# Patient Record
Sex: FEMALE | Race: WHITE | URBAN - METROPOLITAN AREA
[De-identification: names, ages, dates, MRNs, and addresses within clinical notes are randomized per-mention and may not be internally consistent; named-entity substitution may affect disease eponyms.]

---

## 2018-12-18 ENCOUNTER — OFFICE VISIT (OUTPATIENT)
Dept: URBAN - METROPOLITAN AREA CLINIC 23 | Facility: CLINIC | Age: 63
End: 2018-12-18
Payer: COMMERCIAL

## 2018-12-18 PROCEDURE — 92134 CPTRZ OPH DX IMG PST SGM RTA: CPT | Performed by: OPHTHALMOLOGY

## 2018-12-18 PROCEDURE — 99213 OFFICE O/P EST LOW 20 MIN: CPT | Performed by: OPHTHALMOLOGY

## 2018-12-18 ASSESSMENT — INTRAOCULAR PRESSURE
OS: 13
OD: 16

## 2018-12-18 NOTE — IMPRESSION/PLAN
Impression: Cystoid macular degeneration, right eye - Condition: unstable. Code: P13.686.
s/p TSPCIOL OD 12/23/2015 with Dr. Arvin Gaviria. s/p RD repair OD 4/17/2015 w/Dr Arvin Mustafa s/p S.O. Removal 6/4/15 w/ retinal tear repair. Plan: Discussed diagnosis in detail with patient. Exam and OCT show mild edema OD. Recommend to start Prolensa or Ilevro OD QD to help reduce the swelling. Will reassess condition in 10 wks. (gave pt 2 different coupons to see if it helps with cost).

## 2019-03-18 ENCOUNTER — OFFICE VISIT (OUTPATIENT)
Dept: URBAN - METROPOLITAN AREA CLINIC 23 | Facility: CLINIC | Age: 64
End: 2019-03-18
Payer: COMMERCIAL

## 2019-03-18 DIAGNOSIS — H04.123 DRY EYE SYNDROME OF BILATERAL LACRIMAL GLANDS: ICD-10-CM

## 2019-03-18 DIAGNOSIS — H35.351 CYSTOID MACULAR DEGENERATION, RIGHT EYE: Primary | ICD-10-CM

## 2019-03-18 PROCEDURE — 99213 OFFICE O/P EST LOW 20 MIN: CPT | Performed by: OPHTHALMOLOGY

## 2019-03-18 PROCEDURE — 92134 CPTRZ OPH DX IMG PST SGM RTA: CPT | Performed by: OPHTHALMOLOGY

## 2019-03-18 ASSESSMENT — INTRAOCULAR PRESSURE
OD: 12
OS: 14

## 2019-03-18 NOTE — IMPRESSION/PLAN
Impression: Dry eye syndrome of bilateral lacrimal glands: H04.123. OU. Condition: unstable. Vision: vision affected. Plan: Dry eyes account for the patient's complaints. There is no evidence of permanent changes to the cornea. Explained condition does not have a cure and will need GEL artificial tears or Ointment for maintenance.

## 2019-03-18 NOTE — IMPRESSION/PLAN
Impression: Cystoid macular degeneration, right eye - Condition: improved, stable. Code: B69.912.
s/p TSPCIOL OD 12/23/2015 with Dr. Michelle Garvin. s/p RD repair OD 4/17/2015 w/Dr Michelle Man s/p S.O. Removal 6/4/15 w/ retinal tear repair. Plan: Discussed diagnosis in detail with patient. Exam and OCT show no active edema OD. Recommend stopping Prolensa, macula is stable today no edema. Recommend Following up in 7 months when returns from [place] Exam and OCT of the left eye show stable macula, no treatment needed.

## 2019-03-20 ENCOUNTER — OFFICE VISIT (OUTPATIENT)
Dept: URBAN - METROPOLITAN AREA CLINIC 23 | Facility: CLINIC | Age: 64
End: 2019-03-20
Payer: COMMERCIAL

## 2019-03-20 DIAGNOSIS — H40.032 ANATOMICAL NARROW ANGLE, LEFT EYE: Primary | ICD-10-CM

## 2019-03-20 DIAGNOSIS — H40.2214: ICD-10-CM

## 2019-03-20 PROCEDURE — 92133 CPTRZD OPH DX IMG PST SGM ON: CPT | Performed by: OPHTHALMOLOGY

## 2019-03-20 PROCEDURE — 99214 OFFICE O/P EST MOD 30 MIN: CPT | Performed by: OPHTHALMOLOGY

## 2019-03-20 RX ORDER — BROMFENAC SODIUM 0.7 MG/ML
0.07 % SOLUTION/ DROPS OPHTHALMIC
Qty: 3 | Refills: 5 | Status: INACTIVE
Start: 2019-03-20 | End: 2019-03-20

## 2019-03-20 ASSESSMENT — INTRAOCULAR PRESSURE
OS: 16
OD: 16

## 2019-03-20 NOTE — IMPRESSION/PLAN
Impression: Chronic angle-closure glaucoma of right eye, indeterminate stage: X96.8870.  Plan: see notes #1

## 2019-03-20 NOTE — IMPRESSION/PLAN
Impression: Anatomical narrow angle, left eye: H40.032. IOP OU doing well without glaucoma meds. ONH stable ou -
ONH large C/D ratio OD - LPI OS 2/16/17
healthy ONH OS- Plan: Discussed diagnosis, explained and understood. OCT ordered and reviewed with patient. Discussed IOP/ONH/Glaucoma management and risks, explained and understood. no glaucoma meds needed at this time ou.
will continue to monitor pressure. patient going home for the summer. 
patient will see MD in [address] [place] [address] in 4 mo for IOP check

## 2019-12-05 ENCOUNTER — OFFICE VISIT (OUTPATIENT)
Dept: URBAN - METROPOLITAN AREA CLINIC 23 | Facility: CLINIC | Age: 64
End: 2019-12-05
Payer: COMMERCIAL

## 2019-12-05 PROCEDURE — 99213 OFFICE O/P EST LOW 20 MIN: CPT | Performed by: OPHTHALMOLOGY

## 2019-12-05 PROCEDURE — 92134 CPTRZ OPH DX IMG PST SGM RTA: CPT | Performed by: OPHTHALMOLOGY

## 2019-12-05 ASSESSMENT — INTRAOCULAR PRESSURE
OS: 14
OD: 14

## 2019-12-05 NOTE — IMPRESSION/PLAN
Impression: Cystoid macular degeneration, right eye - Condition: improved, stable. Code: O47.656.
s/p TSPCIOL OD 12/23/2015 with Dr. Cisco Brooks. s/p RD repair OD 4/17/2015 w/Dr Cisco Caruso s/p S.O. Removal 6/4/15 w/ retinal tear repair. Plan: Discussed diagnosis in detail with patient. Exam and OCT show no active edema OD. Recommend staying off Prolensa, macula is stable today no edema. Recommend follow up in 5 months Exam and OCT of the left eye show stable macula, no treatment needed.

## 2020-04-23 ENCOUNTER — OFFICE VISIT (OUTPATIENT)
Dept: URBAN - METROPOLITAN AREA CLINIC 23 | Facility: CLINIC | Age: 65
End: 2020-04-23
Payer: COMMERCIAL

## 2020-04-23 PROCEDURE — 99213 OFFICE O/P EST LOW 20 MIN: CPT | Performed by: OPHTHALMOLOGY

## 2020-04-23 ASSESSMENT — INTRAOCULAR PRESSURE
OS: 11
OD: 11

## 2020-04-23 NOTE — IMPRESSION/PLAN
Impression: Cystoid macular degeneration, right eye - Condition: stable. Code: R22.154.
s/p TSPCIOL OD 12/23/2015 with Dr. Alejandra Mueller. s/p RD repair OD 4/17/2015 w/Dr Alejandra Hickey s/p S.O. Removal 6/4/15 w/ retinal tear repair. Plan: Discussed diagnosis in detail with patient. Exam shows the retina is stable. OCT and Optos shows the macula is stable. Recommend a retina follow - up in [place] if needed. No qtts needed. OCT and Optos of the left eye show stable macula, no treatment needed. Recommend a Lid Consult w/Dr Seng Appiah. : skin tag LLL.

## 2020-04-28 ENCOUNTER — OFFICE VISIT (OUTPATIENT)
Dept: URBAN - METROPOLITAN AREA CLINIC 23 | Facility: CLINIC | Age: 65
End: 2020-04-28
Payer: COMMERCIAL

## 2020-04-28 DIAGNOSIS — D48.1 NEOPLASM OF UNCERTAIN BEHAVIOR OF CONNECTIVE AND OTHER SOFT TISSUE: Primary | ICD-10-CM

## 2020-04-28 PROCEDURE — 99214 OFFICE O/P EST MOD 30 MIN: CPT | Performed by: OPHTHALMOLOGY

## 2020-04-28 PROCEDURE — 67840 REMOVE EYELID LESION: CPT | Performed by: OPHTHALMOLOGY

## 2020-04-28 RX ORDER — ERYTHROMYCIN 5 MG/G
OINTMENT OPHTHALMIC
Qty: 1 | Refills: 0 | Status: INACTIVE
Start: 2020-04-28 | End: 2020-12-31

## 2020-04-28 NOTE — IMPRESSION/PLAN
Impression: Neoplasm of uncertain behavior of connective and other soft tissue: D48.1. Plan: Discussed diagnosis in detail with patient. Discussed treatment options with patient. Recommend biopsy, obtain prior authorization, further conduct after biopsy results. RBA's discussed in detail with patient today, patient understands and agrees and wishes to proceed with procedure. Schedule excision of lesion with biopsy of the Left Lower Eyelid, same day in clinic.

## 2020-12-31 ENCOUNTER — OFFICE VISIT (OUTPATIENT)
Dept: URBAN - METROPOLITAN AREA CLINIC 23 | Facility: CLINIC | Age: 65
End: 2020-12-31
Payer: COMMERCIAL

## 2020-12-31 PROCEDURE — 92134 CPTRZ OPH DX IMG PST SGM RTA: CPT | Performed by: OPHTHALMOLOGY

## 2020-12-31 PROCEDURE — 99213 OFFICE O/P EST LOW 20 MIN: CPT | Performed by: OPHTHALMOLOGY

## 2020-12-31 ASSESSMENT — INTRAOCULAR PRESSURE
OD: 11
OS: 11

## 2020-12-31 NOTE — IMPRESSION/PLAN
Impression: Cystoid macular degeneration, right eye - Condition: stable. Code: K84.136. Vision: affected. s/p TSPCIOL OD 12/23/2015 with Dr. Juan Antonio Rodas. s/p RD repair OD 4/17/2015 w/Dr Juan Antonio Nelson s/p S.O. Removal 6/4/15 w/ retinal tear repair. Plan: Discussed diagnosis in detail with patient. Exam shows the retina is stable. OCT OD shows a decrease in CMT and Optos shows the macula is stable. Recommend a retina follow - up in 1yr, sooner if there is a change or decrease in vision. Continue follow - ups in [place] if needed. OCT and Optos of the left eye show stable macula, no treatment needed. Patient states she had a fall in July and hit her right side of her face and injured her right eye. Saw a doctor in [place], [place] and was treated with eye meds due to elevated IOP, she has been stable for the last 3 mos.

## 2021-12-30 ENCOUNTER — OFFICE VISIT (OUTPATIENT)
Dept: URBAN - METROPOLITAN AREA CLINIC 23 | Facility: CLINIC | Age: 66
End: 2021-12-30
Payer: MEDICARE

## 2021-12-30 DIAGNOSIS — H33.051 TOTAL RETINAL DETACHMENT, RIGHT EYE: Primary | ICD-10-CM

## 2021-12-30 DIAGNOSIS — H43.822 VITREOMACULAR ADHESION, LEFT EYE: ICD-10-CM

## 2021-12-30 PROCEDURE — 92313 C-LENS FITG CORNEOSCLRL LENS: CPT | Performed by: OPHTHALMOLOGY

## 2021-12-30 PROCEDURE — 99213 OFFICE O/P EST LOW 20 MIN: CPT | Performed by: OPHTHALMOLOGY

## 2021-12-30 PROCEDURE — 92134 CPTRZ OPH DX IMG PST SGM RTA: CPT | Performed by: OPHTHALMOLOGY

## 2021-12-30 ASSESSMENT — INTRAOCULAR PRESSURE
OS: 11
OD: 12

## 2021-12-30 NOTE — IMPRESSION/PLAN
Impression: Vitreomacular adhesion, left eye: H43.822. Left. Condition: stable. Vision: vision not affected. Plan: Discussed diagnosis in detail with patient. No treatment is required at this time. Will continue to observe condition and or symptoms. OCT OS shows stable VMA and Optos OS stable. Recommend observation once a year.

## 2021-12-30 NOTE — IMPRESSION/PLAN
Impression: Total retinal detachment, right eye: H33.051. Right. Condition: stable. Vision: stable. s/p TSPCIOL OD 12/23/2015 with Dr. Jennifer Timmons. s/p RD repair OD 4/17/2015 w/Dr Jennifer Crews s/p S.O. Removal 6/4/15 w/ retinal tear repair. Plan: Discussed diagnosis in detail with patient. No treatment is required at this time. Will continue to observe condition and or symptoms. Discussed continuing vs discontinuing Timolol. Will reassess in 6 week for an IOP check. OCT OD shows stable and Optos OD shows retina fully attached.

## 2022-02-08 ENCOUNTER — OFFICE VISIT (OUTPATIENT)
Dept: URBAN - METROPOLITAN AREA CLINIC 23 | Facility: CLINIC | Age: 67
End: 2022-02-08
Payer: COMMERCIAL

## 2022-02-08 PROCEDURE — 92134 CPTRZ OPH DX IMG PST SGM RTA: CPT | Performed by: OPHTHALMOLOGY

## 2022-02-08 PROCEDURE — 99213 OFFICE O/P EST LOW 20 MIN: CPT | Performed by: OPHTHALMOLOGY

## 2022-02-08 ASSESSMENT — INTRAOCULAR PRESSURE
OS: 12
OD: 12

## 2022-02-08 NOTE — IMPRESSION/PLAN
Impression: Vitreomacular adhesion, left eye: H43.822. Left. Condition: stable. Vision: vision not affected. Plan: Discussed diagnosis in detail with patient. No treatment is required at this time. Will continue to observe condition and or symptoms. OCT OS shows stable VMA and Optos OS stable. Recommend a f/u in 8 mos.

## 2022-02-08 NOTE — IMPRESSION/PLAN
Impression: s/p repair of Total retinal detachment, right eye: H33.051. Right. Condition: stable. Vision: stable. s/p TSPCIOL OD 12/23/2015 with Dr. Maximo Solorio. s/p RD repair OD 4/17/2015 w/Dr Maximo Moss s/p S.O. Removal 6/4/15 w/ retinal tear repair. Plan: Discussed diagnosis in detail with patient. Exam shows retina is attached. No treatment is required at this time. Will continue to observe condition and or symptoms. OCT OD shows stable and Optos OD shows retina fully attached. Will reassess in 8 mos. Patient will be leaving to [place] for the summer.

## 2022-12-09 ENCOUNTER — OFFICE VISIT (OUTPATIENT)
Dept: URBAN - METROPOLITAN AREA CLINIC 23 | Facility: CLINIC | Age: 67
End: 2022-12-09
Payer: MEDICARE

## 2022-12-09 DIAGNOSIS — H33.051 TOTAL RETINAL DETACHMENT, RIGHT EYE: Primary | ICD-10-CM

## 2022-12-09 DIAGNOSIS — H43.822 VITREOMACULAR ADHESION, LEFT EYE: ICD-10-CM

## 2022-12-09 PROCEDURE — 92134 CPTRZ OPH DX IMG PST SGM RTA: CPT | Performed by: OPHTHALMOLOGY

## 2022-12-09 PROCEDURE — 99213 OFFICE O/P EST LOW 20 MIN: CPT | Performed by: OPHTHALMOLOGY

## 2022-12-09 ASSESSMENT — INTRAOCULAR PRESSURE
OS: 15
OD: 12

## 2022-12-09 NOTE — IMPRESSION/PLAN
Impression: Vitreomacular adhesion, left eye: H43.822. Left. Condition: stable. Vision: vision not affected. Plan: Discussed diagnosis in detail with patient. No treatment is required at this time. Will continue to observe condition and or symptoms. OCT OS shows stable VMA, unchanged from last visit and Optos OS stable. Recommend a f/u in 12 mos.

## 2022-12-09 NOTE — IMPRESSION/PLAN
Impression: s/p repair of Total retinal detachment, right eye: H33.051. Right. Condition: stable. Vision: stable. s/p TSPCIOL OD 12/23/2015 with Dr. Adonis Joshua. s/p RD repair OD 4/17/2015 w/Dr Smith Plan: Discussed diagnosis in detail with patient. Exam shows retina is attached. No treatment is required at this time. Will continue to observe condition and or symptoms. OCT OD shows stable and Optos OD shows retina fully attached. Will reassess in 12 months.

## 2023-12-12 ENCOUNTER — OFFICE VISIT (OUTPATIENT)
Dept: URBAN - METROPOLITAN AREA CLINIC 23 | Facility: CLINIC | Age: 68
End: 2023-12-12
Payer: COMMERCIAL

## 2023-12-12 DIAGNOSIS — H43.822 VITREOMACULAR ADHESION, LEFT EYE: ICD-10-CM

## 2023-12-12 DIAGNOSIS — H33.051 TOTAL RETINAL DETACHMENT, RIGHT EYE: Primary | ICD-10-CM

## 2023-12-12 PROCEDURE — 92134 CPTRZ OPH DX IMG PST SGM RTA: CPT | Performed by: OPHTHALMOLOGY

## 2023-12-12 PROCEDURE — 99213 OFFICE O/P EST LOW 20 MIN: CPT | Performed by: OPHTHALMOLOGY

## 2023-12-12 ASSESSMENT — INTRAOCULAR PRESSURE
OS: 16
OD: 13

## 2024-12-20 ENCOUNTER — OFFICE VISIT (OUTPATIENT)
Dept: URBAN - METROPOLITAN AREA CLINIC 23 | Facility: CLINIC | Age: 69
End: 2024-12-20
Payer: MEDICARE

## 2024-12-20 DIAGNOSIS — H33.051 TOTAL RETINAL DETACHMENT, RIGHT EYE: Primary | ICD-10-CM

## 2024-12-20 DIAGNOSIS — H04.123 DRY EYE SYNDROME OF BILATERAL LACRIMAL GLANDS: ICD-10-CM

## 2024-12-20 DIAGNOSIS — H43.812 VITREOUS DEGENERATION, LEFT EYE: ICD-10-CM

## 2024-12-20 PROCEDURE — 99213 OFFICE O/P EST LOW 20 MIN: CPT | Performed by: OPHTHALMOLOGY

## 2024-12-20 PROCEDURE — 92134 CPTRZ OPH DX IMG PST SGM RTA: CPT | Performed by: OPHTHALMOLOGY

## 2024-12-20 ASSESSMENT — INTRAOCULAR PRESSURE
OS: 15
OD: 16